# Patient Record
Sex: MALE | Race: WHITE | NOT HISPANIC OR LATINO | Employment: OTHER | URBAN - METROPOLITAN AREA
[De-identification: names, ages, dates, MRNs, and addresses within clinical notes are randomized per-mention and may not be internally consistent; named-entity substitution may affect disease eponyms.]

---

## 2017-02-13 ENCOUNTER — TELEPHONE (OUTPATIENT)
Dept: INTERNAL MEDICINE | Facility: CLINIC | Age: 62
End: 2017-02-13

## 2017-02-13 RX ORDER — OSELTAMIVIR PHOSPHATE 75 MG/1
75 CAPSULE ORAL DAILY
Qty: 10 CAPSULE | Refills: 0 | Status: SHIPPED | OUTPATIENT
Start: 2017-02-13 | End: 2017-04-11

## 2017-02-13 RX ORDER — AMOXICILLIN 500 MG/1
500 CAPSULE ORAL 2 TIMES DAILY
Qty: 20 CAPSULE | Refills: 0 | Status: SHIPPED | OUTPATIENT
Start: 2017-02-13 | End: 2017-04-11

## 2017-02-13 RX ORDER — ATORVASTATIN CALCIUM 10 MG/1
TABLET, FILM COATED ORAL
Qty: 30 TABLET | Refills: 6 | Status: SHIPPED | OUTPATIENT
Start: 2017-02-13 | End: 2017-09-13 | Stop reason: SDUPTHER

## 2017-02-13 NOTE — TELEPHONE ENCOUNTER
Tamiflu 75 mg PO Daily X 10 days with no refills       Amoxicillin 500 mg PO BID X 10 days with no refills. Incase he needs this during trip.

## 2017-02-13 NOTE — TELEPHONE ENCOUNTER
----- Message from Rocio Ross sent at 2/13/2017  8:12 AM EST -----  Contact: Patient  Patient is leaving tomorrow for [place] and wife was diagnosed at [facility] with the flu.  [facility] suggested he call PCP and get prophylactic dose of Tamiflu.  Wants to know if we can send this rx to [facility].  Patient can be reached at [phone].  Also wants to know if he can get an antibiotic to take with him since he will be gone for 3 weeks?

## 2017-04-11 ENCOUNTER — OFFICE VISIT (OUTPATIENT)
Dept: INTERNAL MEDICINE | Facility: CLINIC | Age: 62
End: 2017-04-11

## 2017-04-11 VITALS
BODY MASS INDEX: 31.54 KG/M2 | OXYGEN SATURATION: 96 % | HEART RATE: 59 BPM | WEIGHT: 223 LBS | TEMPERATURE: 98 F | SYSTOLIC BLOOD PRESSURE: 130 MMHG | DIASTOLIC BLOOD PRESSURE: 82 MMHG | RESPIRATION RATE: 16 BRPM

## 2017-04-11 DIAGNOSIS — Z87.898 HISTORY OF ELEVATED PSA: Primary | ICD-10-CM

## 2017-04-11 DIAGNOSIS — Z87.442 HISTORY OF NEPHROLITHIASIS: ICD-10-CM

## 2017-04-11 DIAGNOSIS — K40.90 LEFT INGUINAL HERNIA: ICD-10-CM

## 2017-04-11 PROCEDURE — 99213 OFFICE O/P EST LOW 20 MIN: CPT | Performed by: PHYSICIAN ASSISTANT

## 2017-04-11 RX ORDER — PANTOPRAZOLE SODIUM 40 MG/1
TABLET, DELAYED RELEASE ORAL
COMMUNITY
Start: 2017-01-27 | End: 2017-05-18

## 2017-04-11 NOTE — PROGRESS NOTES
"Subjective   Vickey Barreto is a 62 y.o. male.   Chief Complaint   Patient presents with   • Pt needs referrals     Pt needs referral to urologist, and hernia repairment. Insurance no longer par's with urologist of 15 years.     History of Present Illness     Pt here needing referrals.      He has been seeing Dr. Baumann for the past 15 years for PSA and prostate exams.  He had elevated PSA in the past and has undergone 2 prostate biopsies.      He also needs a referral to a surgeon for what he feels is a left inguinal hernia.  He felt a \"bulge\" in this area a few months ago and notices an ache after standing for an extended period of time.  Has had right inguinal hernia repair x 2 by Dr. Chivo Saenz at [facility].      The following portions of the patient's history were reviewed and updated as appropriate: allergies, current medications, past family history, past medical history, past social history, past surgical history and problem list.    Review of Systems   Constitutional: Negative.    HENT: Negative.    Respiratory: Negative.    Cardiovascular: Negative.    Gastrointestinal:        Possible hernia   Genitourinary: Negative.    Musculoskeletal: Negative.    Neurological: Negative.    Psychiatric/Behavioral: Negative.        Objective   Physical Exam   Constitutional: He appears well-developed and well-nourished.   Cardiovascular: Normal rate, regular rhythm and normal heart sounds.    Pulmonary/Chest: Effort normal and breath sounds normal. No respiratory distress.   Abdominal:   No hernia palpated.    Psychiatric: He has a normal mood and affect. His behavior is normal. Judgment and thought content normal.   Vitals reviewed.      Assessment/Plan   Vickey was seen today for pt needs referrals.    Diagnoses and all orders for this visit:    History of elevated PSA  -     Ambulatory Referral to Urology    History of nephrolithiasis  -     Ambulatory Referral to Urology    Left inguinal hernia  -     Ambulatory " Referral to General Surgery

## 2017-05-18 ENCOUNTER — OFFICE VISIT (OUTPATIENT)
Dept: INTERNAL MEDICINE | Facility: CLINIC | Age: 62
End: 2017-05-18

## 2017-05-18 VITALS
RESPIRATION RATE: 18 BRPM | SYSTOLIC BLOOD PRESSURE: 102 MMHG | BODY MASS INDEX: 30.94 KG/M2 | HEART RATE: 92 BPM | HEIGHT: 71 IN | DIASTOLIC BLOOD PRESSURE: 62 MMHG | WEIGHT: 221 LBS

## 2017-05-18 DIAGNOSIS — R09.89 GLOBUS SENSATION: Primary | ICD-10-CM

## 2017-05-18 DIAGNOSIS — R49.0 RASPY VOICE: ICD-10-CM

## 2017-05-18 PROCEDURE — 99213 OFFICE O/P EST LOW 20 MIN: CPT | Performed by: PHYSICIAN ASSISTANT

## 2017-05-18 RX ORDER — ONDANSETRON 4 MG/1
4 TABLET, FILM COATED ORAL EVERY 8 HOURS PRN
COMMUNITY
Start: 2017-05-12

## 2017-07-28 ENCOUNTER — OFFICE VISIT (OUTPATIENT)
Dept: INTERNAL MEDICINE | Facility: CLINIC | Age: 62
End: 2017-07-28

## 2017-07-28 VITALS
RESPIRATION RATE: 16 BRPM | WEIGHT: 227 LBS | OXYGEN SATURATION: 96 % | TEMPERATURE: 99 F | HEART RATE: 63 BPM | DIASTOLIC BLOOD PRESSURE: 88 MMHG | SYSTOLIC BLOOD PRESSURE: 122 MMHG | BODY MASS INDEX: 31.66 KG/M2

## 2017-07-28 DIAGNOSIS — Z13.6 SCREENING FOR CARDIOVASCULAR CONDITION: Primary | ICD-10-CM

## 2017-07-28 LAB
ALBUMIN SERPL-MCNC: 4.2 G/DL (ref 3.2–4.8)
ALBUMIN/GLOB SERPL: 1.8 G/DL (ref 1.5–2.5)
ALP SERPL-CCNC: 78 U/L (ref 25–100)
ALT SERPL W P-5'-P-CCNC: 32 U/L (ref 7–40)
ANION GAP SERPL CALCULATED.3IONS-SCNC: 3 MMOL/L (ref 3–11)
ARTICHOKE IGE QN: 75 MG/DL (ref 0–130)
AST SERPL-CCNC: 23 U/L (ref 0–33)
BILIRUB SERPL-MCNC: 0.6 MG/DL (ref 0.3–1.2)
BUN BLD-MCNC: 16 MG/DL (ref 9–23)
BUN/CREAT SERPL: 20 (ref 7–25)
CALCIUM SPEC-SCNC: 9.1 MG/DL (ref 8.7–10.4)
CHLORIDE SERPL-SCNC: 108 MMOL/L (ref 99–109)
CHOLEST SERPL-MCNC: 147 MG/DL (ref 0–200)
CO2 SERPL-SCNC: 28 MMOL/L (ref 20–31)
CREAT BLD-MCNC: 0.8 MG/DL (ref 0.6–1.3)
DEPRECATED RDW RBC AUTO: 46.2 FL (ref 37–54)
ERYTHROCYTE [DISTWIDTH] IN BLOOD BY AUTOMATED COUNT: 13.3 % (ref 11.3–14.5)
GFR SERPL CREATININE-BSD FRML MDRD: 98 ML/MIN/1.73
GLOBULIN UR ELPH-MCNC: 2.3 GM/DL
GLUCOSE BLD-MCNC: 98 MG/DL (ref 70–100)
HCT VFR BLD AUTO: 47.3 % (ref 38.9–50.9)
HDLC SERPL-MCNC: 42 MG/DL (ref 40–60)
HGB BLD-MCNC: 14.9 G/DL (ref 13.1–17.5)
MCH RBC QN AUTO: 29.8 PG (ref 27–31)
MCHC RBC AUTO-ENTMCNC: 31.5 G/DL (ref 32–36)
MCV RBC AUTO: 94.6 FL (ref 80–99)
PLATELET # BLD AUTO: 224 10*3/MM3 (ref 150–450)
PMV BLD AUTO: 10.1 FL (ref 6–12)
POTASSIUM BLD-SCNC: 4.7 MMOL/L (ref 3.5–5.5)
PROT SERPL-MCNC: 6.5 G/DL (ref 5.7–8.2)
RBC # BLD AUTO: 5 10*6/MM3 (ref 4.2–5.76)
SODIUM BLD-SCNC: 139 MMOL/L (ref 132–146)
TRIGL SERPL-MCNC: 122 MG/DL (ref 0–150)
WBC NRBC COR # BLD: 7.26 10*3/MM3 (ref 3.5–10.8)

## 2017-07-28 PROCEDURE — 36415 COLL VENOUS BLD VENIPUNCTURE: CPT | Performed by: INTERNAL MEDICINE

## 2017-07-28 PROCEDURE — 99396 PREV VISIT EST AGE 40-64: CPT | Performed by: INTERNAL MEDICINE

## 2017-07-28 PROCEDURE — 80053 COMPREHEN METABOLIC PANEL: CPT | Performed by: INTERNAL MEDICINE

## 2017-07-28 PROCEDURE — 80061 LIPID PANEL: CPT | Performed by: INTERNAL MEDICINE

## 2017-07-28 PROCEDURE — 85027 COMPLETE CBC AUTOMATED: CPT | Performed by: INTERNAL MEDICINE

## 2017-07-28 RX ORDER — PHENOL 1.4 %
1 AEROSOL, SPRAY (ML) MUCOUS MEMBRANE DAILY
COMMUNITY

## 2017-07-28 NOTE — PROGRESS NOTES
Subjective   Vickey Barreto is a 62 y.o. male.   Pt is here for annual exam.             History of Present Illness   Pt is here for annual exam.   Colonoscopy- Done in 2014 not sure of exact date, he is due in 2024  PSA- 1 month ago   HARDIK- Done June 2017   Shingles vaccine-Due now   PNA vaccine- Due at 66 yo  Tetanus booster- Due now   Flu vaccine- Due in the Fall           The following portions of the patient's history were reviewed and updated as appropriate: allergies, current medications, past family history, past medical history, past social history, past surgical history and problem list.           Review of Systems   Constitutional: Negative.    HENT: Negative.    Eyes: Negative.    Respiratory: Negative.    Cardiovascular: Negative.    Gastrointestinal: Negative.    Endocrine: Negative.    Genitourinary: Negative.    Musculoskeletal: Negative.    Skin: Negative.    Neurological: Negative.    Hematological: Negative.    Psychiatric/Behavioral: Negative.             Objective   Physical Exam   Constitutional: He is oriented to person, place, and time. He appears well-developed and well-nourished.   HENT:   Head: Normocephalic and atraumatic.   Right Ear: External ear normal.   Left Ear: External ear normal.   Nose: Nose normal.   Mouth/Throat: Oropharynx is clear and moist.   Eyes: Conjunctivae and EOM are normal. Pupils are equal, round, and reactive to light.   Neck: Normal range of motion. Neck supple. No tracheal deviation present. No thyromegaly present.   Cardiovascular: Normal rate, regular rhythm, normal heart sounds and intact distal pulses.    Pulmonary/Chest: Effort normal and breath sounds normal.   Abdominal: Soft. Bowel sounds are normal.   Neurological: He is alert and oriented to person, place, and time. He has normal reflexes.   Skin: Skin is warm and dry.   Psychiatric: He has a normal mood and affect.   Nursing note and vitals reviewed.            Assessment/Plan      Screening for  cardiovascular condition  -     Comprehensive Metabolic Panel  -     Lipid Panel  -     CBC (No Diff)        1.) Pt will check on Tetanus booster/Shingles vaccine at pharmacy   2.) Check CBC,CMP and lipid panel   3.) Follow up in 1 year or sooner if needed.

## 2017-09-06 RX ORDER — DICLOFENAC SODIUM 75 MG/1
TABLET, DELAYED RELEASE ORAL
Qty: 60 TABLET | Refills: 11 | Status: SHIPPED | OUTPATIENT
Start: 2017-09-06 | End: 2021-08-31

## 2017-09-13 RX ORDER — ATORVASTATIN CALCIUM 10 MG/1
TABLET, FILM COATED ORAL
Qty: 30 TABLET | Refills: 6 | Status: SHIPPED | OUTPATIENT
Start: 2017-09-13 | End: 2021-08-31

## 2017-11-10 NOTE — TELEPHONE ENCOUNTER
Please advise     Vaccine Information Statement(s) was given today. This has been reviewed, questions answered, and verbal consent given by Parent for injection(s) and administration of Influenza (Inactivated).    1. Does the patient have a moderate to severe fever?  No  2. Has the patient had a serious reaction to a flu shot before?   No  3. Has the patient ever had [place] Syndrome within 6 weeks of a previous flu shot?  No  4. Does the patient have a serious allergy to eggs?  No  5. Is the patient less that 6 months of age?  No    Patient is eligible to receive the vaccine based on all questions being answered as 'No'.  Patient tolerated without incident. See immunization grid for documentation.

## 2017-11-30 ENCOUNTER — OFFICE VISIT (OUTPATIENT)
Dept: RETAIL CLINIC | Facility: CLINIC | Age: 62
End: 2017-11-30

## 2017-11-30 VITALS
SYSTOLIC BLOOD PRESSURE: 126 MMHG | RESPIRATION RATE: 18 BRPM | HEIGHT: 71 IN | WEIGHT: 227 LBS | HEART RATE: 82 BPM | OXYGEN SATURATION: 98 % | TEMPERATURE: 98.2 F | BODY MASS INDEX: 31.78 KG/M2 | DIASTOLIC BLOOD PRESSURE: 72 MMHG

## 2017-11-30 DIAGNOSIS — J01.10 ACUTE NON-RECURRENT FRONTAL SINUSITIS: ICD-10-CM

## 2017-11-30 DIAGNOSIS — H66.91 RIGHT OTITIS MEDIA, UNSPECIFIED OTITIS MEDIA TYPE: Primary | ICD-10-CM

## 2017-11-30 PROCEDURE — 99213 OFFICE O/P EST LOW 20 MIN: CPT | Performed by: NURSE PRACTITIONER

## 2017-11-30 RX ORDER — FLUTICASONE PROPIONATE 50 MCG
2 SPRAY, SUSPENSION (ML) NASAL DAILY
Qty: 1 BOTTLE | Refills: 0 | Status: SHIPPED | OUTPATIENT
Start: 2017-11-30

## 2017-11-30 RX ORDER — BROMPHENIRAMINE MALEATE, PSEUDOEPHEDRINE HYDROCHLORIDE, AND DEXTROMETHORPHAN HYDROBROMIDE 2; 30; 10 MG/5ML; MG/5ML; MG/5ML
5 SYRUP ORAL 4 TIMES DAILY PRN
Qty: 240 ML | Refills: 0 | Status: SHIPPED | OUTPATIENT
Start: 2017-11-30 | End: 2017-12-05

## 2017-11-30 RX ORDER — CEFDINIR 300 MG/1
300 CAPSULE ORAL 2 TIMES DAILY
Qty: 20 CAPSULE | Refills: 0 | Status: SHIPPED | OUTPATIENT
Start: 2017-11-30 | End: 2017-12-10

## 2017-11-30 NOTE — PATIENT INSTRUCTIONS
Sinusitis, Adult  Sinusitis is soreness and inflammation of your sinuses. Sinuses are hollow spaces in the bones around your face. They are located:  · Around your eyes.  · In the middle of your forehead.  · Behind your nose.  · In your cheekbones.  Your sinuses and nasal passages are lined with a stringy fluid (mucus). Mucus normally drains out of your sinuses. When your nasal tissues get inflamed or swollen, the mucus can get trapped or blocked so air cannot flow through your sinuses. This lets bacteria, viruses, and funguses grow, and that leads to infection.  HOME CARE  Medicines  · Take, use, or apply over-the-counter and prescription medicines only as told by your doctor. These may include nasal sprays.  · If you were prescribed an antibiotic medicine, take it as told by your doctor. Do not stop taking the antibiotic even if you start to feel better.  Hydrate and Humidify  · Drink enough water to keep your pee (urine) clear or pale yellow.  · Use a cool mist humidifier to keep the humidity level in your home above 50%.  · Breathe in steam for 10-15 minutes, 3-4 times a day or as told by your doctor. You can do this in the bathroom while a hot shower is running.  · Try not to spend time in cool or dry air.  Rest  · Rest as much as possible.    · Sleep with your head raised (elevated).  · Make sure to get enough sleep each night.  General Instructions  · Put a warm, moist washcloth on your face 3-4 times a day or as told by your doctor. This will help with discomfort.  · Wash your hands often with soap and water. If there is no soap and water, use hand .  · Do not smoke. Avoid being around people who are smoking (secondhand smoke).  · Keep all follow-up visits as told by your doctor. This is important.  GET HELP IF:  · You have a fever.  · Your symptoms get worse.  · Your symptoms do not get better within 10 days.  GET HELP RIGHT AWAY IF:  · You have a very bad headache.  · You cannot stop throwing up  (vomiting).  · You have pain or swelling around your face or eyes.  · You have trouble seeing.  · You feel confused.  · Your neck is stiff.  · You have trouble breathing.     This information is not intended to replace advice given to you by your health care provider. Make sure you discuss any questions you have with your health care provider.     Document Released: 06/05/2009 Document Revised: 04/10/2017 Document Reviewed: 10/12/2016  [employer] Interactive Patient Education ©2017 [employer]

## 2017-11-30 NOTE — PROGRESS NOTES
Subjective   Vickey Barreto is a 62 y.o. male.     Headache    This is a new (sinus headache) problem. The current episode started in the past 7 days. The problem occurs intermittently. The problem has been gradually worsening. The pain is located in the right unilateral region. The pain does not radiate. The pain quality is similar to prior headaches. The quality of the pain is described as throbbing and aching. The pain is at a severity of 2/10. The pain is mild. Associated symptoms include coughing, drainage, ear pain (right ear), rhinorrhea, scalp tenderness, sinus pressure and weakness. Pertinent negatives include no abdominal pain, abnormal behavior, anorexia, back pain, blurred vision, dizziness, eye pain, eye redness, eye watering, facial sweating, fever, hearing loss, insomnia, loss of balance, muscle aches, nausea, neck pain, numbness, phonophobia, photophobia, seizures, sore throat, swollen glands, tingling, tinnitus, visual change, vomiting or weight loss. Exacerbated by: lying down. He has tried acetaminophen (tylenol sinus) for the symptoms. The treatment provided mild relief. There is no history of cancer, cluster headaches, hypertension, immunosuppression, migraine headaches, migraines in the family, obesity, recent head traumas or TMJ. Sinus disease: frequent sinus infection.   Earache    Associated symptoms include coughing, drainage, headaches and rhinorrhea. Pertinent negatives include no abdominal pain, hearing loss, neck pain, rash, sore throat or vomiting.        Current Outpatient Prescriptions on File Prior to Visit   Medication Sig Dispense Refill   • atorvastatin (LIPITOR) 10 MG tablet TAKE 1 TABLET BY MOUTH EVERY DAY 30 tablet 6   • diclofenac (VOLTAREN) 75 MG EC tablet TAKE 1 TABLET BY MOUTH TWICE DAILY 60 tablet 11   • Melatonin 10 MG tablet Take 1 tablet by mouth Daily.     • peppermint oil liquid As Needed.     • ondansetron (ZOFRAN) 4 MG tablet Take 4 mg by mouth Every 8 (Eight)  Hours As Needed.     • tiZANidine (ZANAFLEX) 4 MG tablet Take 1 tablet by mouth At Night As Needed for muscle spasms. 30 tablet 0     No current facility-administered medications on file prior to visit.        No Known Allergies    Past Medical History:   Diagnosis Date   • Acid reflux    • Allergic    • Cataract    • Ear infection    • Elevated cholesterol    • Hyperlipidemia    • Kidney stones    • Knee pain        Past Surgical History:   Procedure Laterality Date   • EYE SURGERY     • HERNIA REPAIR     • LATERAL COLLATERAL LIGAMENT REPAIR, KNEE     • LEG SURGERY     • LEG SURGERY     • ROTATOR CUFF REPAIR         Family History   Problem Relation Age of Onset   • Osteoporosis Mother    • Stroke Father    • Allergies Father    • Hypertension Father    • Lung disease Father        Social History     Social History   • Marital status:      Spouse name: N/A   • Number of children: N/A   • Years of education: N/A     Occupational History   • Not on file.     Social History Main Topics   • Smoking status: Former Smoker   • Smokeless tobacco: Never Used   • Alcohol use Yes      Comment: Occasional   • Drug use: No   • Sexual activity: Defer     Other Topics Concern   • Not on file     Social History Narrative       Review of Systems   Constitutional: Negative for activity change, appetite change, fever and weight loss.   HENT: Positive for ear pain (right ear), rhinorrhea, sinus pressure and sneezing. Negative for hearing loss, sore throat, tinnitus and voice change.    Eyes: Negative for blurred vision, photophobia, pain, discharge, redness and itching.   Respiratory: Positive for cough.    Gastrointestinal: Negative for abdominal pain, anorexia, nausea and vomiting.   Musculoskeletal: Negative for back pain and neck pain.   Skin: Negative for rash.   Allergic/Immunologic: Positive for environmental allergies.   Neurological: Positive for weakness and headaches. Negative for dizziness, tingling, seizures,  "numbness and loss of balance.   Psychiatric/Behavioral: The patient does not have insomnia.        /72 (BP Location: Left arm, Patient Position: Sitting, Cuff Size: Adult)  Pulse 82  Temp 98.2 °F (36.8 °C)  Resp 18  Ht 71\" (180.3 cm)  Wt 227 lb (103 kg)  SpO2 98%  BMI 31.66 kg/m2    Objective   Physical Exam   Constitutional: He is oriented to person, place, and time. He appears well-developed and well-nourished. He is cooperative. He does not appear ill.   HENT:   Head: Normocephalic and atraumatic.   Right Ear: External ear and ear canal normal. Tympanic membrane is erythematous.   Left Ear: External ear and ear canal normal. Tympanic membrane is injected.   Nose: Rhinorrhea present. Right sinus exhibits no maxillary sinus tenderness and no frontal sinus tenderness. Left sinus exhibits no maxillary sinus tenderness and no frontal sinus tenderness.   Mouth/Throat: Uvula is midline and mucous membranes are normal. Oropharyngeal exudate and posterior oropharyngeal erythema present.   Eyes: Conjunctivae and lids are normal.   Cardiovascular: Normal heart sounds.    Pulmonary/Chest: Effort normal and breath sounds normal.   Lymphadenopathy:     He has cervical adenopathy.   Neurological: He is alert and oriented to person, place, and time.   Skin: Skin is warm and dry. No rash noted.   Psychiatric: He has a normal mood and affect. His speech is normal and behavior is normal.         Assessment/Plan   Vickey was seen today for headache and earache.    Diagnoses and all orders for this visit:    Right otitis media, unspecified otitis media type  -     cefdinir (OMNICEF) 300 MG capsule; Take 1 capsule by mouth 2 (Two) Times a Day for 10 days.  -     fluticasone (FLONASE) 50 MCG/ACT nasal spray; 2 sprays into each nostril Daily.  -     brompheniramine-pseudoephedrine-DM 30-2-10 MG/5ML syrup; Take 5 mL by mouth 4 (Four) Times a Day As Needed for Cough for up to 5 days.    Acute non-recurrent frontal " sinusitis  -     fluticasone (FLONASE) 50 MCG/ACT nasal spray; 2 sprays into each nostril Daily.  -     brompheniramine-pseudoephedrine-DM 30-2-10 MG/5ML syrup; Take 5 mL by mouth 4 (Four) Times a Day As Needed for Cough for up to 5 days.      Results for orders placed or performed in visit on 07/28/17   Comprehensive Metabolic Panel   Result Value Ref Range    Glucose 98 70 - 100 mg/dL    BUN 16 9 - 23 mg/dL    Creatinine 0.80 0.60 - 1.30 mg/dL    Sodium 139 132 - 146 mmol/L    Potassium 4.7 3.5 - 5.5 mmol/L    Chloride 108 99 - 109 mmol/L    CO2 28.0 20.0 - 31.0 mmol/L    Calcium 9.1 8.7 - 10.4 mg/dL    Total Protein 6.5 5.7 - 8.2 g/dL    Albumin 4.20 3.20 - 4.80 g/dL    ALT (SGPT) 32 7 - 40 U/L    AST (SGOT) 23 0 - 33 U/L    Alkaline Phosphatase 78 25 - 100 U/L    Total Bilirubin 0.6 0.3 - 1.2 mg/dL    eGFR Non African Amer 98 >60 mL/min/1.73    Globulin 2.3 gm/dL    A/G Ratio 1.8 1.5 - 2.5 g/dL    BUN/Creatinine Ratio 20.0 7.0 - 25.0    Anion Gap 3.0 3.0 - 11.0 mmol/L   Lipid Panel   Result Value Ref Range    Total Cholesterol 147 0 - 200 mg/dL    Triglycerides 122 0 - 150 mg/dL    HDL Cholesterol 42 40 - 60 mg/dL    LDL Cholesterol  75 0 - 130 mg/dL   CBC (No Diff)   Result Value Ref Range    WBC 7.26 3.50 - 10.80 10*3/mm3    RBC 5.00 4.20 - 5.76 10*6/mm3    Hemoglobin 14.9 13.1 - 17.5 g/dL    Hematocrit 47.3 38.9 - 50.9 %    MCV 94.6 80.0 - 99.0 fL    MCH 29.8 27.0 - 31.0 pg    MCHC 31.5 (L) 32.0 - 36.0 g/dL    RDW 13.3 11.3 - 14.5 %    RDW-SD 46.2 37.0 - 54.0 fl    MPV 10.1 6.0 - 12.0 fL    Platelets 224 150 - 450 10*3/mm3       Follow up with PCP or go to the nearest emergency room if symptoms worsen or fail to improve.

## 2017-12-30 ENCOUNTER — HOSPITAL ENCOUNTER (EMERGENCY)
Facility: HOSPITAL | Age: 62
Discharge: HOME | End: 2017-12-30
Attending: EMERGENCY MEDICINE | Admitting: EMERGENCY MEDICINE

## 2017-12-30 VITALS
OXYGEN SATURATION: 95 % | HEART RATE: 60 BPM | BODY MASS INDEX: 30.38 KG/M2 | RESPIRATION RATE: 18 BRPM | WEIGHT: 217 LBS | SYSTOLIC BLOOD PRESSURE: 143 MMHG | TEMPERATURE: 97.5 F | HEIGHT: 71 IN | DIASTOLIC BLOOD PRESSURE: 82 MMHG

## 2017-12-30 DIAGNOSIS — S46.912A LEFT SHOULDER STRAIN, INITIAL ENCOUNTER: Primary | ICD-10-CM

## 2017-12-30 PROCEDURE — 99282 EMERGENCY DEPT VISIT SF MDM: CPT

## 2020-08-17 ENCOUNTER — TRANSCRIBE ORDERS (OUTPATIENT)
Dept: ADMINISTRATIVE | Facility: HOSPITAL | Age: 65
End: 2020-08-17

## 2020-08-17 DIAGNOSIS — Z87.891 PERSONAL HISTORY OF TOBACCO USE, PRESENTING HAZARDS TO HEALTH: Primary | ICD-10-CM

## 2020-08-28 ENCOUNTER — HOSPITAL ENCOUNTER (OUTPATIENT)
Dept: ULTRASOUND IMAGING | Facility: HOSPITAL | Age: 65
Discharge: HOME | End: 2020-08-28
Admitting: STUDENT IN AN ORGANIZED HEALTH CARE EDUCATION/TRAINING PROGRAM

## 2020-08-28 DIAGNOSIS — Z87.891 PERSONAL HISTORY OF TOBACCO USE, PRESENTING HAZARDS TO HEALTH: ICD-10-CM

## 2020-08-28 PROCEDURE — 76706 US ABDL AORTA SCREEN AAA: CPT

## 2020-09-29 ENCOUNTER — HOSPITAL ENCOUNTER (OUTPATIENT)
Dept: GENERAL RADIOLOGY | Facility: HOSPITAL | Age: 65
Discharge: HOME | End: 2020-09-29
Admitting: STUDENT IN AN ORGANIZED HEALTH CARE EDUCATION/TRAINING PROGRAM

## 2020-09-29 ENCOUNTER — TRANSCRIBE ORDERS (OUTPATIENT)
Dept: ADMINISTRATIVE | Facility: HOSPITAL | Age: 65
End: 2020-09-29

## 2020-09-29 DIAGNOSIS — M25.571 ACUTE RIGHT ANKLE PAIN: Primary | ICD-10-CM

## 2020-09-29 PROCEDURE — 73610 X-RAY EXAM OF ANKLE: CPT

## 2021-08-31 PROCEDURE — U0005 INFEC AGEN DETEC AMPLI PROBE: HCPCS | Performed by: NURSE PRACTITIONER

## 2021-08-31 PROCEDURE — U0004 COV-19 TEST NON-CDC HGH THRU: HCPCS | Performed by: NURSE PRACTITIONER
